# Patient Record
Sex: MALE | Race: WHITE | NOT HISPANIC OR LATINO | Employment: OTHER | URBAN - METROPOLITAN AREA
[De-identification: names, ages, dates, MRNs, and addresses within clinical notes are randomized per-mention and may not be internally consistent; named-entity substitution may affect disease eponyms.]

---

## 2018-05-05 ENCOUNTER — APPOINTMENT (OUTPATIENT)
Dept: GENERAL RADIOLOGY | Facility: CLINIC | Age: 68
End: 2018-05-05
Attending: FAMILY MEDICINE
Payer: MEDICARE

## 2018-05-05 ENCOUNTER — HOSPITAL ENCOUNTER (EMERGENCY)
Facility: CLINIC | Age: 68
Discharge: NURSING, REHAB OR LONG-TERM CARE | End: 2018-05-05
Attending: FAMILY MEDICINE | Admitting: FAMILY MEDICINE
Payer: MEDICARE

## 2018-05-05 VITALS
TEMPERATURE: 100.7 F | WEIGHT: 315 LBS | RESPIRATION RATE: 23 BRPM | BODY MASS INDEX: 56.58 KG/M2 | SYSTOLIC BLOOD PRESSURE: 110 MMHG | DIASTOLIC BLOOD PRESSURE: 82 MMHG | OXYGEN SATURATION: 93 %

## 2018-05-05 DIAGNOSIS — J18.9 PNEUMONIA OF RIGHT LOWER LOBE DUE TO INFECTIOUS ORGANISM: ICD-10-CM

## 2018-05-05 DIAGNOSIS — I50.9 CONGESTIVE HEART FAILURE, UNSPECIFIED CONGESTIVE HEART FAILURE CHRONICITY, UNSPECIFIED CONGESTIVE HEART FAILURE TYPE: ICD-10-CM

## 2018-05-05 LAB
ALBUMIN SERPL-MCNC: 3.4 G/DL (ref 3.4–5)
ALP SERPL-CCNC: 122 U/L (ref 40–150)
ALT SERPL W P-5'-P-CCNC: 25 U/L (ref 0–70)
ANION GAP SERPL CALCULATED.3IONS-SCNC: 4 MMOL/L (ref 3–14)
APTT PPP: 35 SEC (ref 22–37)
AST SERPL W P-5'-P-CCNC: 19 U/L (ref 0–45)
BASE DEFICIT BLDV-SCNC: 0.9 MMOL/L
BASE DEFICIT BLDV-SCNC: 1.6 MMOL/L
BASOPHILS # BLD AUTO: 0.1 10E9/L (ref 0–0.2)
BASOPHILS NFR BLD AUTO: 0.4 %
BILIRUB SERPL-MCNC: 1.1 MG/DL (ref 0.2–1.3)
BUN SERPL-MCNC: 27 MG/DL (ref 7–30)
CALCIUM SERPL-MCNC: 8.7 MG/DL (ref 8.5–10.1)
CHLORIDE SERPL-SCNC: 104 MMOL/L (ref 94–109)
CO2 SERPL-SCNC: 29 MMOL/L (ref 20–32)
CREAT SERPL-MCNC: 1.66 MG/DL (ref 0.66–1.25)
DIFFERENTIAL METHOD BLD: ABNORMAL
EOSINOPHIL # BLD AUTO: 0.2 10E9/L (ref 0–0.7)
EOSINOPHIL NFR BLD AUTO: 0.9 %
ERYTHROCYTE [DISTWIDTH] IN BLOOD BY AUTOMATED COUNT: 14.5 % (ref 10–15)
GFR SERPL CREATININE-BSD FRML MDRD: 42 ML/MIN/1.7M2
GLUCOSE SERPL-MCNC: 289 MG/DL (ref 70–99)
HCO3 BLDV-SCNC: 27 MMOL/L (ref 21–28)
HCO3 BLDV-SCNC: 31 MMOL/L (ref 21–28)
HCT VFR BLD AUTO: 48 % (ref 40–53)
HETEROPH AB SER QL: NEGATIVE
HGB BLD-MCNC: 15.4 G/DL (ref 13.3–17.7)
IMM GRANULOCYTES # BLD: 0.1 10E9/L (ref 0–0.4)
IMM GRANULOCYTES NFR BLD: 0.4 %
INR PPP: 2.52 (ref 0.86–1.14)
LACTATE BLD-SCNC: 1.5 MMOL/L (ref 0.7–2)
LIPASE SERPL-CCNC: 161 U/L (ref 73–393)
LYMPHOCYTES # BLD AUTO: 2.9 10E9/L (ref 0.8–5.3)
LYMPHOCYTES NFR BLD AUTO: 17.4 %
MAGNESIUM SERPL-MCNC: 1.8 MG/DL (ref 1.6–2.3)
MCH RBC QN AUTO: 31.2 PG (ref 26.5–33)
MCHC RBC AUTO-ENTMCNC: 32.1 G/DL (ref 31.5–36.5)
MCV RBC AUTO: 97 FL (ref 78–100)
MONOCYTES # BLD AUTO: 1.5 10E9/L (ref 0–1.3)
MONOCYTES NFR BLD AUTO: 9.1 %
NEUTROPHILS # BLD AUTO: 11.9 10E9/L (ref 1.6–8.3)
NEUTROPHILS NFR BLD AUTO: 71.8 %
NT-PROBNP SERPL-MCNC: 4075 PG/ML (ref 0–900)
PCO2 BLDV: 62 MM HG (ref 40–50)
PCO2 BLDV: 90 MM HG (ref 40–50)
PH BLDV: 7.15 PH (ref 7.32–7.43)
PH BLDV: 7.25 PH (ref 7.32–7.43)
PHOSPHATE SERPL-MCNC: 4.3 MG/DL (ref 2.5–4.5)
PLATELET # BLD AUTO: 185 10E9/L (ref 150–450)
PO2 BLDV: 31 MM HG (ref 25–47)
PO2 BLDV: 50 MM HG (ref 25–47)
POTASSIUM SERPL-SCNC: 5.3 MMOL/L (ref 3.4–5.3)
PROT SERPL-MCNC: 8.2 G/DL (ref 6.8–8.8)
RBC # BLD AUTO: 4.93 10E12/L (ref 4.4–5.9)
SODIUM SERPL-SCNC: 137 MMOL/L (ref 133–144)
TROPONIN I SERPL-MCNC: <0.015 UG/L (ref 0–0.04)
WBC # BLD AUTO: 16.6 10E9/L (ref 4–11)

## 2018-05-05 PROCEDURE — 85730 THROMBOPLASTIN TIME PARTIAL: CPT | Performed by: FAMILY MEDICINE

## 2018-05-05 PROCEDURE — 84100 ASSAY OF PHOSPHORUS: CPT | Performed by: FAMILY MEDICINE

## 2018-05-05 PROCEDURE — 40000275 ZZH STATISTIC RCP TIME EA 10 MIN

## 2018-05-05 PROCEDURE — 83605 ASSAY OF LACTIC ACID: CPT | Performed by: FAMILY MEDICINE

## 2018-05-05 PROCEDURE — 96367 TX/PROPH/DG ADDL SEQ IV INF: CPT | Performed by: FAMILY MEDICINE

## 2018-05-05 PROCEDURE — 25000125 ZZHC RX 250: Performed by: FAMILY MEDICINE

## 2018-05-05 PROCEDURE — 80053 COMPREHEN METABOLIC PANEL: CPT | Performed by: FAMILY MEDICINE

## 2018-05-05 PROCEDURE — 85610 PROTHROMBIN TIME: CPT | Performed by: FAMILY MEDICINE

## 2018-05-05 PROCEDURE — 94640 AIRWAY INHALATION TREATMENT: CPT

## 2018-05-05 PROCEDURE — 83735 ASSAY OF MAGNESIUM: CPT | Performed by: FAMILY MEDICINE

## 2018-05-05 PROCEDURE — 96365 THER/PROPH/DIAG IV INF INIT: CPT | Performed by: FAMILY MEDICINE

## 2018-05-05 PROCEDURE — 25000128 H RX IP 250 OP 636: Performed by: FAMILY MEDICINE

## 2018-05-05 PROCEDURE — 96375 TX/PRO/DX INJ NEW DRUG ADDON: CPT | Performed by: FAMILY MEDICINE

## 2018-05-05 PROCEDURE — 96376 TX/PRO/DX INJ SAME DRUG ADON: CPT | Performed by: FAMILY MEDICINE

## 2018-05-05 PROCEDURE — 86308 HETEROPHILE ANTIBODY SCREEN: CPT | Performed by: FAMILY MEDICINE

## 2018-05-05 PROCEDURE — 25000128 H RX IP 250 OP 636

## 2018-05-05 PROCEDURE — 93005 ELECTROCARDIOGRAM TRACING: CPT | Performed by: FAMILY MEDICINE

## 2018-05-05 PROCEDURE — 94660 CPAP INITIATION&MGMT: CPT

## 2018-05-05 PROCEDURE — 83690 ASSAY OF LIPASE: CPT | Performed by: FAMILY MEDICINE

## 2018-05-05 PROCEDURE — 99291 CRITICAL CARE FIRST HOUR: CPT | Mod: 25 | Performed by: FAMILY MEDICINE

## 2018-05-05 PROCEDURE — 82803 BLOOD GASES ANY COMBINATION: CPT | Mod: 91 | Performed by: FAMILY MEDICINE

## 2018-05-05 PROCEDURE — 99285 EMERGENCY DEPT VISIT HI MDM: CPT | Mod: 25 | Performed by: FAMILY MEDICINE

## 2018-05-05 PROCEDURE — 25000132 ZZH RX MED GY IP 250 OP 250 PS 637: Performed by: FAMILY MEDICINE

## 2018-05-05 PROCEDURE — 83880 ASSAY OF NATRIURETIC PEPTIDE: CPT | Performed by: FAMILY MEDICINE

## 2018-05-05 PROCEDURE — 96368 THER/DIAG CONCURRENT INF: CPT | Performed by: FAMILY MEDICINE

## 2018-05-05 PROCEDURE — 71045 X-RAY EXAM CHEST 1 VIEW: CPT

## 2018-05-05 PROCEDURE — 85025 COMPLETE CBC W/AUTO DIFF WBC: CPT | Performed by: FAMILY MEDICINE

## 2018-05-05 PROCEDURE — 93010 ELECTROCARDIOGRAM REPORT: CPT | Mod: Z6 | Performed by: FAMILY MEDICINE

## 2018-05-05 PROCEDURE — 84484 ASSAY OF TROPONIN QUANT: CPT | Performed by: FAMILY MEDICINE

## 2018-05-05 PROCEDURE — 99292 CRITICAL CARE ADDL 30 MIN: CPT | Mod: Z6 | Performed by: FAMILY MEDICINE

## 2018-05-05 RX ORDER — FUROSEMIDE 10 MG/ML
80 INJECTION INTRAMUSCULAR; INTRAVENOUS ONCE
Status: COMPLETED | OUTPATIENT
Start: 2018-05-05 | End: 2018-05-05

## 2018-05-05 RX ORDER — LORAZEPAM 2 MG/ML
INJECTION INTRAMUSCULAR
Status: COMPLETED
Start: 2018-05-05 | End: 2018-05-05

## 2018-05-05 RX ORDER — LORAZEPAM 2 MG/ML
2 INJECTION INTRAMUSCULAR ONCE
Status: COMPLETED | OUTPATIENT
Start: 2018-05-05 | End: 2018-05-05

## 2018-05-05 RX ORDER — LORAZEPAM 2 MG/ML
1 INJECTION INTRAMUSCULAR ONCE
Status: COMPLETED | OUTPATIENT
Start: 2018-05-05 | End: 2018-05-05

## 2018-05-05 RX ORDER — CEFTRIAXONE SODIUM 1 G/50ML
1 INJECTION, SOLUTION INTRAVENOUS ONCE
Status: COMPLETED | OUTPATIENT
Start: 2018-05-05 | End: 2018-05-05

## 2018-05-05 RX ORDER — DILTIAZEM HYDROCHLORIDE 5 MG/ML
20 INJECTION INTRAVENOUS ONCE
Status: COMPLETED | OUTPATIENT
Start: 2018-05-05 | End: 2018-05-05

## 2018-05-05 RX ORDER — IPRATROPIUM BROMIDE AND ALBUTEROL SULFATE 2.5; .5 MG/3ML; MG/3ML
3 SOLUTION RESPIRATORY (INHALATION) ONCE
Status: COMPLETED | OUTPATIENT
Start: 2018-05-05 | End: 2018-05-05

## 2018-05-05 RX ADMIN — LORAZEPAM 2 MG: 2 INJECTION INTRAMUSCULAR; INTRAVENOUS at 17:23

## 2018-05-05 RX ADMIN — LORAZEPAM 2 MG: 2 INJECTION INTRAMUSCULAR; INTRAVENOUS at 18:10

## 2018-05-05 RX ADMIN — HYDROCORTISONE SODIUM SUCCINATE 100 MG: 100 INJECTION, POWDER, FOR SOLUTION INTRAMUSCULAR; INTRAVENOUS at 17:30

## 2018-05-05 RX ADMIN — AZITHROMYCIN MONOHYDRATE 500 MG: 500 INJECTION, POWDER, LYOPHILIZED, FOR SOLUTION INTRAVENOUS at 18:12

## 2018-05-05 RX ADMIN — NITROGLYCERIN 15 MG: 20 OINTMENT TOPICAL at 18:00

## 2018-05-05 RX ADMIN — DILTIAZEM HYDROCHLORIDE 5 MG/HR: 5 INJECTION INTRAVENOUS at 18:37

## 2018-05-05 RX ADMIN — LORAZEPAM 2 MG: 2 INJECTION INTRAMUSCULAR at 17:23

## 2018-05-05 RX ADMIN — LORAZEPAM 1 MG: 2 INJECTION INTRAMUSCULAR; INTRAVENOUS at 17:30

## 2018-05-05 RX ADMIN — DILTIAZEM HYDROCHLORIDE 20 MG: 5 INJECTION INTRAVENOUS at 18:40

## 2018-05-05 RX ADMIN — IPRATROPIUM BROMIDE AND ALBUTEROL SULFATE 3 ML: .5; 3 SOLUTION RESPIRATORY (INHALATION) at 17:25

## 2018-05-05 RX ADMIN — CEFTRIAXONE SODIUM 1 G: 1 INJECTION, SOLUTION INTRAVENOUS at 18:42

## 2018-05-05 RX ADMIN — FUROSEMIDE 80 MG: 10 INJECTION, SOLUTION INTRAVENOUS at 17:26

## 2018-05-05 NOTE — ED NOTES
Pt given additional ativan. Attempted cardioversion x2 @ 200J. Unsuccessful. pt continues in afib with RVR

## 2018-05-05 NOTE — ED PROVIDER NOTES
History     Chief Complaint   Patient presents with     Respiratory Distress     HPI  Silvano Hobson is a 68 year old male, past medical history significant for anemia, malnutrition, Hanover's disease, sepsis, cardiomyopathy, atrial fibrillation, osteoarthritis, morbid obesity, pulmonary hypertension, pulmonary embolism, long-term anticoagulation for atrial fibrillation on Coumadin, presents via EMS with concerns of acute shortness of breath.  She is obtained largely from EMS as the patient is confused/obtunded at the time of presentation in respiratory distress.  Paramedics were called for increasing shortness of air with approximately half a day of shortness of breath by their history, no fever noted, no complaints of chest pain.  The patient seemed markedly short of breath and was hypoxic on arrival they placed supplemental oxygen and try to place BiPAP for the patient when he did not improve significantly with nonrebreather mask.  He was unable to tolerate the BiPAP and was fighting them.  EMS impression was of congestive heart failure and the patient was placed briefly on a nitroglycerin drip which was discontinued shortly prior to arrival.  Minimal history is obtained from the patient who is in extremis at the time of presentation.    Problem List:    Patient Active Problem List    Diagnosis Date Noted     Anemia 08/23/2013     Priority: Medium     Low serum cortisol level (H) 08/23/2013     Priority: Medium     Malnutrition (H) 08/23/2013     Priority: Medium     Sepsis (H) 08/23/2013     Priority: Medium     Problem list name updated by automated process. 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 Provider to review       Venous (peripheral) insufficiency 03/23/2006     Priority: Medium     Problem list name updated by automated process. Provider to review       Obesity 09/28/2005     Priority: Medium     weight 400  Problem list name updated by automated process. Provider to review       Essential hypertension 09/28/2005     Priority: Medium     Problem list name updated by automated process. Provider to review       Long term current use of anticoagulant therapy 09/28/2005     Priority: Medium     Pulmonary embolism   Problem list name updated by automated process. Provider to review       Primary pulmonary hypertension (H) 09/28/2005     Priority: Medium     Pulmonary embolism and infarction (H) 09/28/2005     Priority: Medium     Multiple PE by HX -Pulmonologist Dr. Rom Weinberg [facility]  Problem list name updated by automated process. Provider to review          Past Medical History:    Past Medical History:   Diagnosis Date     Long term (current) use of anticoagulants      Obesity, unspecified      Other pulmonary embolism and infarction      Primary pulmonary hypertension (H)      Unspecified essential hypertension        Past Surgical History:    Past Surgical History:   Procedure Laterality Date     COLONOSCOPY  4/5/05    Repeat 5 years     SURGICAL HISTORY OF -   1988    Gastric bypass     SURGICAL HISTORY OF -       Ventral hernia repair     SURGICAL HISTORY OF -   10/8/2003    post gastric bypass with gastrojejunostomy Billroth tube       Family History:    Family History   Problem Relation Age of Onset     Hypertension Mother      Hypertension Father      Cancer - colorectal Father        Social History:  Marital Status:    [2]  Social History   Substance Use Topics     Smoking status: Never Smoker     Smokeless tobacco: Not on file     Alcohol use Yes      Comment: occ        Medications:      acetaminophen (TYLENOL) 325 MG tablet   allopurinol (ZYLOPRIM) 300 MG tablet   amLODIPine (NORVASC) 5 MG tablet   COMPRESSION STOCKINGS   glipiZIDE (GLUCOTROL) 2.5 MG TABS   HEPARIN SODIUM, PORCINE, IV   hydrOXYzine (ATARAX) 25 MG tablet   metoprolol (LOPRESSOR) 50 MG tablet   ondansetron (ZOFRAN) 8 MG tablet   oxyCODONE (ROXICODONE) 5 MG immediate release tablet   senna-docusate (SENNA S) 8.6-50 MG per tablet   Warfarin Sodium (COUMADIN PO)         Review of Systems   Unable to perform ROS: Acuity of condition       Physical Exam   BP: (!) 121/99  Heart Rate: 186  Temp: 100.7  F (38.2  C)  Resp: 29  Weight: (!) 181.4 kg (400 lb)  SpO2: 93 %      Physical Exam  Confused if not frankly obtunded, does not answer questions.  Struggling with caregivers to remove IV tubing and facemask as well as attempt with BiPAP.  Clearly in extremis.  Markedly tachypneic, tachycardic, irregularly irregular rhythm.  Accessory muscle use.  Clinical respiratory failure.  HEENT oropharynx was clear tympanic members were normal PERRLA, PROM normal.  No cervical lymphadenopathy.  Chest inspiratory crackles bibasilar worse on the right side than the left.  Faint expiratory wheeze bilateral.  Heart sounds are irregularly irregular at approximately 180 bpm initially.  The abdomen is hugely obese.  Bowel sounds are absent.  Extremities obese and edematous bilateral lower extremities.  No evidence of trauma.      ED Course     ED Course     Procedures               EKG Interpretation:      Interpreted by Matt Carvajal  Time reviewed:     Symptoms at time of EKG: Diaphoresis, respiratory failure, obtundation  Rhythm: atrial fibrillation - rapid  Rate: 158  Axis: Left Axis Deviation  Ectopy: none  Conduction: normal  ST Segments/ T Waves: Non-specific ST-T wave  changes  Q Waves: none  Comparison to prior: No old EKG available    Clinical Impression: Atrial fibrillation with RVR, nonspecific ST-T wave changes.                Critical Care time:  was 90 minutes for this patient excluding procedures.  As we were notified prior to this patient's arrival preparations were made for intubation prior to the patient's arrival.  When he arrived given his level of confusion and combativeness I elected to sedate him initially and attempt BiPAP.  With Versed for sedation we were able to use BiPAP effectively and improve the patient's VBG significantly upon review of his VBGs in the emergency department.  He did not require intubation and improved on the BiPAP.   He remained markedly tachycardic in the 170-180 range A. fib RVR.  I attempted to correct this with synchronous cardioversion ×2 with very little if any effect upon the patient's overall rate and rhythm.  He was and subsequently cautiously given 20 mg of Cardizem and an infusion starting at 5 mg for rate control my goal being to bring him down into the 1 20 bpm range.  This ultimately was quite effective in reducing his right his rate, seemingly improving his comfort noting decreased requirement for sedation as well as improved blood pressure.  Lab diagnostics are reviewed noting a leukocytosis of 16.6 with left shift and bands.  INR is therapeutic at 2.5 to the patient on Coumadin.  His glucose is elevated at 289 BNP elevated at 4075.  His initial VBG showed a pH of 7.1.5 PCO2 of 90 venous bicarb of 31.  Subsequently approximately 40 minutes after BiPAP is pH improved to 7.25 PCO2 had decreased down to 62 SPO2 was higher at 50.  Further adjustments to the ventilator were made in consultation with the respiratory tech.  Chest x-ray is reviewed raising the suspicion of possible right lower lobe infiltrate.  Single view only.  I was able to review care everywhere for this patient and note a history of Lamoille's disease and for  that reason the patient received 100 mg of Solu-Cortef.  Given his presentation he received antibiotic therapy with Rocephin and Zithromax both intravenously.  The patient's presentation also raises the distinct possibility of a significant component of congestive heart failure with his elevated BNP as well as chest auscultatory findings and lower extremity changes.  He received 80 mg of IV Lasix initially as well as cutaneous nitrates.  Multiple attempts were made to catheterize him however this was technically difficult due to the small size of his penis we were unable to get him catheterized to accurately quantify urine output.  As per my conversation with EMS the patient had requested to be taken to [facility] prior to becoming unstable in transport and diverting here for stabilization.  The patient certainly requires ICU admission, I spoke with the intensivist at [facility] who agreed to accept his care there as noted below.        Labs Ordered and Resulted from Time of ED Arrival Up to the Time of Departure from the ED   CBC WITH PLATELETS DIFFERENTIAL - Abnormal; Notable for the following:        Result Value    WBC 16.6 (*)     Absolute Neutrophil 11.9 (*)     Absolute Monocytes 1.5 (*)     All other components within normal limits   INR - Abnormal; Notable for the following:     INR 2.52 (*)     All other components within normal limits   COMPREHENSIVE METABOLIC PANEL - Abnormal; Notable for the following:     Glucose 289 (*)     Creatinine 1.66 (*)     GFR Estimate 42 (*)     GFR Estimate If Black 50 (*)     All other components within normal limits   NT PROBNP INPATIENT - Abnormal; Notable for the following:     N-Terminal Pro BNP Inpatient 4075 (*)     All other components within normal limits   BLOOD GAS VENOUS - Abnormal; Notable for the following:     Ph Venous 7.15 (*)     PCO2 Venous 90 (*)     Bicarbonate Venous 31 (*)     All other components within normal limits   BLOOD GAS VENOUS -  Abnormal; Notable for the following:     Ph Venous 7.25 (*)     PCO2 Venous 62 (*)     PO2 Venous 50 (*)     All other components within normal limits   MONONUCLEOSIS SCREEN   PARTIAL THROMBOPLASTIN TIME   MAGNESIUM   PHOSPHORUS   LIPASE   LACTIC ACID WHOLE BLOOD   TROPONIN I   '         Results for orders placed or performed during the hospital encounter of 05/05/18 (from the past 24 hour(s))   XR Chest Port 1 View    Narrative    XR CHEST PORT 1 VW  5/5/2018 5:49 PM     HISTORY:  soa;     COMPARISON: None.    FINDINGS:  The patient is rotated towards the left. There is increased  opacity in the right lung base. This may be due to the area of  pneumonia. There is slight blunting of the right costophrenic angle  would suggest an associated pleural effusion.      Impression    IMPRESSION: Right basilar opacity, suspect pneumonia.    KALEIGH EDGE MD   Blood gas venous   Result Value Ref Range    Ph Venous 7.25 (L) 7.32 - 7.43 pH    PCO2 Venous 62 (H) 40 - 50 mm Hg    PO2 Venous 50 (H) 25 - 47 mm Hg    Bicarbonate Venous 27 21 - 28 mmol/L    Base Deficit Venous 1.6 mmol/L       Medications   LORazepam (ATIVAN) injection 2 mg (2 mg Intravenous Given 5/5/18 1723)   ipratropium - albuterol 0.5 mg/2.5 mg/3 mL (DUONEB) neb solution 3 mL (3 mLs Nebulization Given 5/5/18 1725)   furosemide (LASIX) injection 80 mg (80 mg Intravenous Given 5/5/18 1726)   nitroGLYcerin (NITRO-BID) 2 % ointment 15 mg (15 mg Transdermal Given 5/5/18 1800)   LORazepam (ATIVAN) injection 1 mg (1 mg Intravenous Given 5/5/18 1730)   hydrocortisone sodium succinate PF (solu-CORTEF) injection 100 mg (0 mg Intravenous Stopped 5/5/18 1849)   cefTRIAXone in d5w (ROCEPHIN) intermittent infusion 1 g (0 g Intravenous Stopped 5/5/18 1913)   azithromycin (ZITHROMAX) 500 mg in sodium chloride 0.9 % 250 mL intermittent infusion (0 mg Intravenous Stopped 5/5/18 1913)   LORazepam (ATIVAN) injection 2 mg (2 mg Intravenous Given 5/5/18 1810)   diltiazem (CARDIZEM)  injection 20 mg (20 mg Intravenous Given 5/5/18 1840)     6:29 PM  Patient was discussed with Dr. Marquez in the ICU at [facility] (the patient had requested to be transferred to [facility] before he became unstable and was brought here) I reviewed the patient presentation in its entirety and Dr. Marquez was comfortable taking the patient in transport to the care of the ICU by ambulance    Assessments & Plan (with Medical Decision Making)   68-year-old male past medical history reviewed as above who presents in acute respiratory distress as discussed in the HPI.  Little history is available from the patient.  His wife was not available during his time in the emergency department.  Attempts by nursing staff will need to contact her.  Patient was stabilized by dressing was felt to be his primary problems at presentation including an infectious process in his lung in the form of pneumonia as well as congestive heart failure.  This is difficult in the context of his morbid obesity to manage these problems.  As the patient had originally requested be taken to [facility] they were contacted and were actually able to accommodate the patient in transfer for admission there.  All questions were answered and the patient was transferred by ambulance to [facility] ICU.      Disclaimer: This note consists of symbols derived from keyboarding, dictation and/or voice recognition software. As a result, there may be errors in the script that have gone undetected. Please consider this when interpreting information found in this chart.      I have reviewed the nursing notes.    I have reviewed the findings, diagnosis, plan and need for follow up with the patient.       Discharge Medication List as of 5/5/2018  8:25 PM          Final diagnoses:   Congestive heart failure, unspecified congestive heart failure chronicity, unspecified congestive heart failure type (H)   Pneumonia of right lower lobe due to infectious organism (H)        5/5/2018   [facility] EMERGENCY DEPARTMENT     Matt Carvajal MD  05/06/18 [id number]

## 2018-05-05 NOTE — ED NOTES
Pt arrived vie ems in acute resp distress, became acutely SOB after using the bathroom at home and called 911. Pt restless, did not tolerated bipap enroute. RT called pt setup for intubation. Continued high flow o2. Pt more somulent and bipap restarted. afib with RVR in monitor.

## 2018-10-26 ENCOUNTER — RECORDS - HEALTHEAST (OUTPATIENT)
Dept: ADMINISTRATIVE | Facility: OTHER | Age: 68
End: 2018-10-26

## 2018-10-26 ENCOUNTER — OFFICE VISIT - HEALTHEAST (OUTPATIENT)
Dept: VASCULAR SURGERY | Facility: CLINIC | Age: 68
End: 2018-10-26

## 2018-10-26 DIAGNOSIS — E66.01 MORBID OBESITY WITH BMI OF 50.0-59.9, ADULT (H): ICD-10-CM

## 2018-10-26 DIAGNOSIS — I50.9 CHF (CONGESTIVE HEART FAILURE) (H): ICD-10-CM

## 2018-10-26 DIAGNOSIS — I87.2 VENOUS INSUFFICIENCY OF BOTH LOWER EXTREMITIES: ICD-10-CM

## 2018-10-26 DIAGNOSIS — L97.212 VENOUS STASIS ULCER OF RIGHT CALF WITH FAT LAYER EXPOSED WITHOUT VARICOSE VEINS (H): ICD-10-CM

## 2018-10-26 DIAGNOSIS — I87.2 VENOUS STASIS ULCER OF RIGHT CALF WITH FAT LAYER EXPOSED WITHOUT VARICOSE VEINS (H): ICD-10-CM

## 2018-10-26 DIAGNOSIS — I87.303 VENOUS HYPERTENSION OF BOTH LOWER EXTREMITIES: ICD-10-CM

## 2018-10-26 DIAGNOSIS — N18.4 CKD (CHRONIC KIDNEY DISEASE), SYMPTOM MANAGEMENT ONLY, STAGE 4 (SEVERE) (H): ICD-10-CM

## 2018-10-26 ASSESSMENT — MIFFLIN-ST. JEOR: SCORE: 2545.27

## 2018-11-02 ENCOUNTER — OFFICE VISIT - HEALTHEAST (OUTPATIENT)
Dept: VASCULAR SURGERY | Facility: CLINIC | Age: 68
End: 2018-11-02

## 2018-11-02 ENCOUNTER — COMMUNICATION - HEALTHEAST (OUTPATIENT)
Dept: VASCULAR SURGERY | Facility: CLINIC | Age: 68
End: 2018-11-02

## 2018-11-02 ENCOUNTER — RECORDS - HEALTHEAST (OUTPATIENT)
Dept: ADMINISTRATIVE | Facility: OTHER | Age: 68
End: 2018-11-02

## 2018-11-02 DIAGNOSIS — N18.4 CKD (CHRONIC KIDNEY DISEASE), SYMPTOM MANAGEMENT ONLY, STAGE 4 (SEVERE) (H): ICD-10-CM

## 2018-11-02 DIAGNOSIS — I87.303 VENOUS HYPERTENSION OF BOTH LOWER EXTREMITIES: ICD-10-CM

## 2018-11-02 DIAGNOSIS — L97.212 VENOUS STASIS ULCER OF RIGHT CALF WITH FAT LAYER EXPOSED WITHOUT VARICOSE VEINS (H): ICD-10-CM

## 2018-11-02 DIAGNOSIS — E66.01 MORBID OBESITY WITH BMI OF 50.0-59.9, ADULT (H): ICD-10-CM

## 2018-11-02 DIAGNOSIS — I87.2 VENOUS INSUFFICIENCY OF BOTH LOWER EXTREMITIES: ICD-10-CM

## 2018-11-02 DIAGNOSIS — I50.9 CHF (CONGESTIVE HEART FAILURE) (H): ICD-10-CM

## 2018-11-02 DIAGNOSIS — I87.2 VENOUS STASIS ULCER OF RIGHT CALF WITH FAT LAYER EXPOSED WITHOUT VARICOSE VEINS (H): ICD-10-CM

## 2018-11-07 ENCOUNTER — COMMUNICATION - HEALTHEAST (OUTPATIENT)
Dept: VASCULAR SURGERY | Facility: CLINIC | Age: 68
End: 2018-11-07

## 2018-11-13 ENCOUNTER — COMMUNICATION - HEALTHEAST (OUTPATIENT)
Dept: VASCULAR SURGERY | Facility: CLINIC | Age: 68
End: 2018-11-13

## 2018-11-16 ENCOUNTER — AMBULATORY - HEALTHEAST (OUTPATIENT)
Dept: VASCULAR SURGERY | Facility: CLINIC | Age: 68
End: 2018-11-16

## 2018-11-16 ENCOUNTER — COMMUNICATION - HEALTHEAST (OUTPATIENT)
Dept: VASCULAR SURGERY | Facility: CLINIC | Age: 68
End: 2018-11-16

## 2018-11-16 DIAGNOSIS — I87.2 VENOUS STASIS DERMATITIS OF BOTH LOWER EXTREMITIES: ICD-10-CM

## 2018-11-16 DIAGNOSIS — I87.2 VENOUS INSUFFICIENCY OF BOTH LOWER EXTREMITIES: ICD-10-CM

## 2018-11-16 DIAGNOSIS — I87.303 VENOUS HYPERTENSION OF LOWER EXTREMITY, BILATERAL: ICD-10-CM

## 2018-11-16 DIAGNOSIS — I87.2 VENOUS STASIS ULCER OF RIGHT CALF WITH FAT LAYER EXPOSED WITHOUT VARICOSE VEINS (H): ICD-10-CM

## 2018-11-16 DIAGNOSIS — L97.212 VENOUS STASIS ULCER OF RIGHT CALF WITH FAT LAYER EXPOSED WITHOUT VARICOSE VEINS (H): ICD-10-CM

## 2018-11-23 ENCOUNTER — OFFICE VISIT - HEALTHEAST (OUTPATIENT)
Dept: VASCULAR SURGERY | Facility: CLINIC | Age: 68
End: 2018-11-23

## 2018-11-23 DIAGNOSIS — I87.303 VENOUS HYPERTENSION OF LOWER EXTREMITY, BILATERAL: ICD-10-CM

## 2018-11-23 DIAGNOSIS — L97.212 VENOUS STASIS ULCER OF RIGHT CALF WITH FAT LAYER EXPOSED WITHOUT VARICOSE VEINS (H): ICD-10-CM

## 2018-11-23 DIAGNOSIS — L89.893 PRESSURE INJURY OF OTHER SITE, STAGE 3 (H): ICD-10-CM

## 2018-11-23 DIAGNOSIS — I87.303 VENOUS HYPERTENSION OF BOTH LOWER EXTREMITIES: ICD-10-CM

## 2018-11-23 DIAGNOSIS — I87.2 VENOUS INSUFFICIENCY OF BOTH LOWER EXTREMITIES: ICD-10-CM

## 2018-11-23 DIAGNOSIS — I50.9 CONGESTIVE HEART FAILURE, UNSPECIFIED HF CHRONICITY, UNSPECIFIED HEART FAILURE TYPE (H): ICD-10-CM

## 2018-11-23 DIAGNOSIS — I87.2 VENOUS STASIS DERMATITIS OF BOTH LOWER EXTREMITIES: ICD-10-CM

## 2018-11-23 DIAGNOSIS — E66.01 MORBID OBESITY WITH BMI OF 50.0-59.9, ADULT (H): ICD-10-CM

## 2018-11-23 DIAGNOSIS — N18.4 CKD (CHRONIC KIDNEY DISEASE), SYMPTOM MANAGEMENT ONLY, STAGE 4 (SEVERE) (H): ICD-10-CM

## 2018-11-23 DIAGNOSIS — I87.2 VENOUS STASIS ULCER OF RIGHT CALF WITH FAT LAYER EXPOSED WITHOUT VARICOSE VEINS (H): ICD-10-CM

## 2018-11-23 ASSESSMENT — MIFFLIN-ST. JEOR: SCORE: 2587.91

## 2018-12-05 ENCOUNTER — COMMUNICATION - HEALTHEAST (OUTPATIENT)
Dept: VASCULAR SURGERY | Facility: CLINIC | Age: 68
End: 2018-12-05

## 2018-12-10 ENCOUNTER — COMMUNICATION - HEALTHEAST (OUTPATIENT)
Dept: VASCULAR SURGERY | Facility: CLINIC | Age: 68
End: 2018-12-10

## 2018-12-10 DIAGNOSIS — I87.2 VENOUS INSUFFICIENCY OF BOTH LOWER EXTREMITIES: ICD-10-CM

## 2018-12-10 DIAGNOSIS — I87.303 VENOUS HYPERTENSION OF LOWER EXTREMITY, BILATERAL: ICD-10-CM

## 2018-12-10 DIAGNOSIS — I87.2 VENOUS STASIS DERMATITIS OF BOTH LOWER EXTREMITIES: ICD-10-CM

## 2018-12-14 ENCOUNTER — OFFICE VISIT - HEALTHEAST (OUTPATIENT)
Dept: VASCULAR SURGERY | Facility: CLINIC | Age: 68
End: 2018-12-14

## 2018-12-14 DIAGNOSIS — I87.303 VENOUS HYPERTENSION OF BOTH LOWER EXTREMITIES: ICD-10-CM

## 2018-12-14 DIAGNOSIS — I50.9 CONGESTIVE HEART FAILURE, UNSPECIFIED HF CHRONICITY, UNSPECIFIED HEART FAILURE TYPE (H): ICD-10-CM

## 2018-12-14 DIAGNOSIS — I87.2 VENOUS INSUFFICIENCY OF BOTH LOWER EXTREMITIES: ICD-10-CM

## 2018-12-14 DIAGNOSIS — I87.2 VENOUS STASIS DERMATITIS OF BOTH LOWER EXTREMITIES: ICD-10-CM

## 2018-12-14 DIAGNOSIS — E66.01 MORBID OBESITY WITH BMI OF 50.0-59.9, ADULT (H): ICD-10-CM

## 2018-12-14 DIAGNOSIS — I87.303 VENOUS HYPERTENSION OF LOWER EXTREMITY, BILATERAL: ICD-10-CM

## 2018-12-14 DIAGNOSIS — I87.2 VENOUS STASIS ULCER OF RIGHT CALF WITH FAT LAYER EXPOSED WITHOUT VARICOSE VEINS (H): ICD-10-CM

## 2018-12-14 DIAGNOSIS — L97.212 VENOUS STASIS ULCER OF RIGHT CALF WITH FAT LAYER EXPOSED WITHOUT VARICOSE VEINS (H): ICD-10-CM

## 2018-12-14 DIAGNOSIS — N18.4 CKD (CHRONIC KIDNEY DISEASE), SYMPTOM MANAGEMENT ONLY, STAGE 4 (SEVERE) (H): ICD-10-CM

## 2019-01-08 ENCOUNTER — OFFICE VISIT - HEALTHEAST (OUTPATIENT)
Dept: VASCULAR SURGERY | Facility: CLINIC | Age: 69
End: 2019-01-08

## 2019-01-08 ENCOUNTER — RECORDS - HEALTHEAST (OUTPATIENT)
Dept: ADMINISTRATIVE | Facility: OTHER | Age: 69
End: 2019-01-08

## 2019-01-08 DIAGNOSIS — N18.4 CKD (CHRONIC KIDNEY DISEASE), SYMPTOM MANAGEMENT ONLY, STAGE 4 (SEVERE) (H): ICD-10-CM

## 2019-01-08 DIAGNOSIS — I87.303 VENOUS HYPERTENSION OF BOTH LOWER EXTREMITIES: ICD-10-CM

## 2019-01-08 DIAGNOSIS — L97.212 VENOUS STASIS ULCER OF RIGHT CALF WITH FAT LAYER EXPOSED WITHOUT VARICOSE VEINS (H): ICD-10-CM

## 2019-01-08 DIAGNOSIS — I50.9 CONGESTIVE HEART FAILURE, UNSPECIFIED HF CHRONICITY, UNSPECIFIED HEART FAILURE TYPE (H): ICD-10-CM

## 2019-01-08 DIAGNOSIS — I87.303 VENOUS HYPERTENSION OF LOWER EXTREMITY, BILATERAL: ICD-10-CM

## 2019-01-08 DIAGNOSIS — I87.2 VENOUS STASIS DERMATITIS OF BOTH LOWER EXTREMITIES: ICD-10-CM

## 2019-01-08 DIAGNOSIS — I87.2 VENOUS INSUFFICIENCY OF BOTH LOWER EXTREMITIES: ICD-10-CM

## 2019-01-08 DIAGNOSIS — I87.2 VENOUS STASIS ULCER OF RIGHT CALF WITH FAT LAYER EXPOSED WITHOUT VARICOSE VEINS (H): ICD-10-CM

## 2019-01-08 DIAGNOSIS — E66.01 MORBID OBESITY WITH BMI OF 50.0-59.9, ADULT (H): ICD-10-CM

## 2019-01-08 ASSESSMENT — MIFFLIN-ST. JEOR: SCORE: 2532.11

## 2019-01-29 ENCOUNTER — COMMUNICATION - HEALTHEAST (OUTPATIENT)
Dept: VASCULAR SURGERY | Facility: CLINIC | Age: 69
End: 2019-01-29

## 2019-02-07 ENCOUNTER — AMBULATORY - HEALTHEAST (OUTPATIENT)
Dept: VASCULAR SURGERY | Facility: CLINIC | Age: 69
End: 2019-02-07

## 2019-02-07 ENCOUNTER — COMMUNICATION - HEALTHEAST (OUTPATIENT)
Dept: VASCULAR SURGERY | Facility: CLINIC | Age: 69
End: 2019-02-07

## 2019-02-28 ENCOUNTER — COMMUNICATION - HEALTHEAST (OUTPATIENT)
Dept: VASCULAR SURGERY | Facility: CLINIC | Age: 69
End: 2019-02-28

## 2021-06-02 VITALS — WEIGHT: 315 LBS | HEIGHT: 70 IN | BODY MASS INDEX: 45.1 KG/M2

## 2021-06-17 NOTE — PATIENT INSTRUCTIONS - HE
Patient Instructions by Davida Brooks NP at 1/8/2019  9:20 AM     Author: Davida Brooks NP Service: -- Author Type: Nurse Practitioner    Filed: 1/8/2019  9:46 AM Encounter Date: 1/8/2019 Status: Signed    : Davida Brooks NP (Nurse Practitioner)       Right leg, lateral aspect  Every 3-4 days  Cleanse with saline  Apply lotion to intact skin  Apply endoform collagen  Oil emulsion  ABD; rolled gauze  2 layer to the right    Collagen Dressings    These dressings are typically created from bovine (cow), laisha (bird), or porcine (pig) products. Collagens can come in a variety of forms including: powder; gel; pads, or freeze-dried sheets. They can be cut, formed and layered to fit the size of your wound.  Collagen dressings are used to stimulate wound healing. Some collagens contain silver which help treat infected or wounds which have been over-colonized with bacteria. Your provider will determine if silver is appropriate for your wound.   Some examples of collagen dressings include: Endoform, Promogran, Destiny    ,

## 2021-06-18 NOTE — LETTER
Letter by Davida Brooks NP at      Author: Davida Brooks NP Service: -- Author Type: --    Filed:  Encounter Date: 2019 Status: (Other)       2019    [facility]  Fax: [phone] Wound Dressing Rx and Order Form  Customer Service: [phone] Referral #:  [id number] ([place])   Order Status: New Order   Verbal: Deann          Patient Info:  Name: Silvano Hobson  : 1950  Address:   [address] [address] [facility] [address]  Phone: [phone]      Insurance Info:  Primary: Payor: MEDICARE / Plan: MEDICARE A AND B / Product Type: Medicare /    Secondary: BLUE CROSS BLUE CROSS PLATINUM  [id number] - (Blue Cross Blue Shield)    Physician Info:   Name:  Davida Brooks NP   Dept Address/Phones:   [address] [place] [address], [address]  [facility] [address] [phone]  Fax: [phone]    Lymphedema circumferential measurements (in cm):  Right just above MTP: 26.3    Right Ankle: 26.2    Right Widest Calf: 46.8    Right Thigh Up 10cm: 64.4    Left - just above MTP: 26.4    Left Ankle: 24.4    Left Widest Calf: 48.8    Left Thigh Up 10cm: 62.7        Wound info:  Encounter Diagnoses   Name Primary?   ? Venous hypertension of lower extremity, bilateral Yes   ? Venous insufficiency of both lower extremities    ? Venous stasis dermatitis of both lower extremities    ? Venous stasis ulcer of right calf with fat layer exposed without varicose veins (H)    ? Venous hypertension of both lower extremities    ? Congestive heart failure, unspecified HF chronicity, unspecified heart failure type (H)    ? CKD (chronic kidney disease), symptom management only, stage 4 (severe) (H)    ? Morbid obesity with BMI of 50.0-59.9, adult (H)      Wound 16 Leg Left (Active)       Wound 16 Leg Left;Proximal (near to center) (Active)       VASC Wound 10/26/18 Rt lateral calf (Active)   Pre Size Length 4.2 2019  9:00 AM   Pre Size Width 0.8 2019  9:00 AM   Pre  Size Depth 0.1 1/8/2019  9:00 AM   Pre Total Sq cm 3.36 1/8/2019  9:00 AM       VASC Wound 11/23/18 ABD (Active)   Pre Size Length 0.5 11/23/2018 10:00 AM   Pre Size Width 0.5 11/23/2018 10:00 AM   Pre Size Depth 0.1 11/23/2018 10:00 AM   Pre Total Sq cm 0.25 11/23/2018 10:00 AM       VASC Wound 01/08/19 Rt lateral (Posterior) (Active)   Pre Size Length 3.8 1/8/2019  9:00 AM   Pre Size Width 1.2 1/8/2019  9:00 AM   Pre Size Depth 0.1 1/8/2019  9:00 AM   Pre Total Sq cm 4.56 1/8/2019  9:00 AM     Drainage: Moderate  Thickness:  Full  Duration of Need: 30  Days Supply: 30  Start Date: 1/8/19  Starter Kit: Ancillary Kit (saline, gloves, gauze)  Qualifying wound/Debridement Yes        Dressing Type Brand Size Number of pieces Frequency of change    Primary Endoform fenestrated   2''x2'' 1 box  every four days                   Secondary ABD Pads  5''x9'' Max  every four days           Tape            Note: If total out of pocket is more than $50.00 please contact the patient before processing order.     OK to forward to covered supplier.    Electronically Signed Physician: Davida Brooks NP Date: 1/8/2019

## 2021-06-23 NOTE — PROGRESS NOTES
Patient greater than 15 min late for appointment and provider gone for day. Trying to contact patient to come in early due to weather and got no answer but did leave a message. Patient demanding to be seen as in pain. Suggested by RN(Umer) to go emergency room or urgency room if patient in that severe of pain. Wife continues to do wound cares and 2 layer wrapping.

## 2021-06-24 NOTE — TELEPHONE ENCOUNTER
Patients spouse called to update us that patient will be following care else where and will no longer need to see LK. No reschedule needed.

## 2021-06-26 NOTE — PROGRESS NOTES
Progress Notes by Davida Brooks NP at 11/2/2018  9:40 AM     Author: Davida Brooks NP Service: -- Author Type: Nurse Practitioner    Filed: 11/2/2018 12:44 PM Encounter Date: 11/2/2018 Status: Signed    : Davida Brooks NP (Nurse Practitioner)       Follow up Vascular Visit       Date of Service:11/2/2018    Date Last Seen: Visit date not found; Visit date not found    Chief Complaint: new right calf wound    History:   Past Medical History:   Diagnosis Date   ? Miguel's disease (H)    ? Adrenal insufficiency (H)    ? Atrial fibrillation (H)    ? Cancer (H)     basal cell carcinoma of the nose   ? Chronic kidney disease    ? Deep vein thrombosis (H)    ? Degenerative joint disease of knee    ? Diabetes mellitus (H)    ? Gout    ? Hepatic steatosis    ? Hypertension    ? Lymphedema    ? Morbid obesity (H)    ? Obesity    ? Pityrosporum folliculitis    ? Pulmonary embolism (H)    ? Systolic heart failure (H)    ? Venous insufficiency        Pt returns to the [facility] with regards to their new right calf wound. Arrives with wife today. Has previously not been seen for 2 years; They report that he was hospitalized this past May for CHF, stage 4 CKD; he is taking bumex prn. He is no longer weighing himself daily. They report that the wound on the right leg started as thickened skin and then he bumped the area and it ulcerated 2-3 weeks ago. We started him on endoform last week and ran him for epifix this was declined. We wrapped his right leg in a 2 layer; tolerated well and stayed in place. He had a lapse in wearing his compression stockings and has not been replacing these in a timely fashion. He denies fevers, chills. Is having pain in the wound.    Allergies: Morphine; Daptomycin; Vancomycin; and Zyvox [linezolid]    Physical Exam:    There were no vitals taken for this visit.    General:  Patient presents to clinic in no apparent distress.  Head:  normocephalic atraumatic  Psychiatric:  Alert and oriented x3.   Respiratory: unlabored breathing; no cough  Integumentary:  Skin is uniformly warm, dry and pink.    Wound #1 Location: right calf  Size: 3L x 1.6W x 0.1depth.  nosinus tract present, Wound base: initially covered with fibrinous material this was debrided reveal 100% viable wound bed;  No undermining present. Periwound: no denudement, erythema, induration, maceration or warmth.  Swelling improved see circumferential measures below.    Circumferential volume measures:    Vasc Edema 5/4/2016 6/8/2016 10/26/2018 11/2/2018   Right just above MTP 25 25.5 26.6 25.6   Right Ankle 24.5 24.4 25.4 25   Right Widest Calf 48 53 49 48.4   Right Thigh Up 10cm - - 74.4 70   Left - just above MTP 26 25.5 26 26.2   Left Ankle 25 24.3 25 24.6   Left Widest Calf 43.5 48 49.8 47.5   Left Thigh Up 10cm - - 66.4 65.5       Ulceration(s)/Wound(s):     Wound 03/07/16 Leg Left (Active)       Wound 05/04/16 Leg Left;Proximal (near to center) (Active)       VASC Wound 10/26/18 Rt lateral calf (Active)   Pre Size Length 3 11/2/2018  9:00 AM   Pre Size Width 1.6 11/2/2018  9:00 AM   Pre Size Depth 0.1 11/2/2018  9:00 AM   Pre Total Sq cm 4.8 11/2/2018  9:00 AM        Lab Values    No results found for: SEDRATE  No results found for: CREATININE  No results found for: HGBA1C  No results found for: BUN  Lab Results   Component Value Date    ALBUMIN 3.3 (L) 03/21/2016     No results found for: [id number]          Impression:  1. Venous stasis ulcer of right calf with fat layer exposed without varicose veins (H)  mupirocin (BACTROBAN) 2 % ointment    gentamicin (GARAMYCIN) 0.1 % ointment   2. Venous insufficiency of both lower extremities  mupirocin (BACTROBAN) 2 % ointment    gentamicin (GARAMYCIN) 0.1 % ointment   3. Venous hypertension of both lower extremities  mupirocin (BACTROBAN) 2 % ointment    gentamicin (GARAMYCIN) 0.1 % ointment   4. CHF (congestive heart failure) (H)   mupirocin (BACTROBAN) 2 % ointment    gentamicin (GARAMYCIN) 0.1 % ointment   5. CKD (chronic kidney disease), symptom management only, stage 4 (severe) (H)  mupirocin (BACTROBAN) 2 % ointment    gentamicin (GARAMYCIN) 0.1 % ointment   6. Morbid obesity with BMI of 50.0-59.9, adult (H)  mupirocin (BACTROBAN) 2 % ointment    gentamicin (GARAMYCIN) 0.1 % ointment     11/2/18 right calf          10/26/18 right calf             Are any of these wounds new today: no    Assessment/Plan:          1. Debridement: Excisional debridement of the ulcer(s) was recommended today, after consent was obtained and 2% Xylocaine was applied using a sterile curet the epidermal, dermal and down into the subcutaneous tissue was sharply debrided for a total square cm of . The non-viable and necrotic tissue was removed and the wounds appeared much  afterwards.             2. Edema: need for daily wound care; will go to tubigrips and short stretch; wife will do this           3.  Wound treatment: wound treatment will include irrigation and dressings to promote autolytic debridement which will include:wound is worse today; I suspect critical colonization; pt declines oral antibiotics; so I will not obtain a culture; will treat with Bactroban and gentamycin for gram - and + coverage; cover with oil emulsion; gauze; rolled gauze; change daily           4. Nutrition: we spoke about his weight and how this contributes to swelling; reminded to weight self daily for monitoring of chf           5. Offloading: na     Patient will follow up with me in 3 weeks for reevaluation. They were instructed to call the clinic sooner with any signs or symptoms of infection or any further questions/concerns. Answered all questions.    Davida Brooks DNP, RN, CNP, [facility]  [phone]        This note was electronically signed by Davida Brooks

## 2021-06-26 NOTE — PROGRESS NOTES
Progress Notes by Davida Brooks NP at 10/26/2018 10:20 AM     Author: Davida Brooks NP Service: -- Author Type: Nurse Practitioner    Filed: 10/26/2018  1:15 PM Encounter Date: 10/26/2018 Status: Signed    : Davida Brooks NP (Nurse Practitioner)       Follow up Vascular Visit       Date of Service:10/26/2018    Date Last Seen: Visit date not found; Visit date not found    Chief Complaint: new right calf wound    History:   Past Medical History:   Diagnosis Date   ? [place]'s disease (H)    ? Adrenal insufficiency (H)    ? Atrial fibrillation (H)    ? Cancer (H)     basal cell carcinoma of the nose   ? Chronic kidney disease    ? Deep vein thrombosis (H)    ? Degenerative joint disease of knee    ? Diabetes mellitus (H)    ? Gout    ? Hepatic steatosis    ? Hypertension    ? Lymphedema    ? Morbid obesity (H)    ? Obesity    ? Pityrosporum folliculitis    ? Pulmonary embolism (H)    ? Systolic heart failure (H)    ? Venous insufficiency        Pt returns to the [facility] with regards to their new right calf wound. Arrives with wife today. Has not been seen for 2 years; previously was seen and treated for leg wounds and swelling; had RFA completed with Dr. Alanis. They report that he was hospitalized this past May for CHF, stage 4 CKD; he is taking bumex prn. He is no longer weighing himself daily. They report that the wound on the right leg started as thickened skin and then he bumped the area and it ulcerated 1-2 weeks ago. They had leftover mepilex ag foam and began applying this along with coban; this was wrapped from the ankle to the calf today. He had a lapse in wearing his compression stockings and has not been replacing these in a timely fashion. He denies fevers, chills. Is having pain in the wound. They are asking to have epifix applied to the wound due to the pain.     Allergies: Morphine; Daptomycin; Vancomycin; and Zyvox  "[linezolid]    Physical Exam:    /74 (Patient Position: Sitting)  Pulse 82  Temp 99.3  F (37.4  C) (Oral)   Resp 22  Ht 5' 10\" (1.778 m)  Wt (!) 391 lb 1.6 oz (177.4 kg)  BMI 56.12 kg/m2    General:  Patient presents to clinic in no apparent distress.  Head: normocephalic atraumatic  Psychiatric:  Alert and oriented x3.   Respiratory: unlabored breathing; no cough  Integumentary:  Skin is uniformly warm, dry and pink.    Wound #1 Location: right calf  Size: 3L x 2W x 0.1depth.  nosinus tract present, Wound base: initially covered with fibrinous material this was debrided reveal 100% viable wound bed;  No undermining present. Periwound: no denudement, erythema, induration, maceration or warmth.  Swelling stable see circumferential measures below.    Circumferential volume measures:    Vasc Edema 5/4/2016 6/8/2016 10/26/2018   Right just above MTP 25 25.5 26.6   Right Ankle 24.5 24.4 25.4   Right Widest Calf 48 53 49   Right Thigh Up 10cm - - 74.4   Left - just above MTP 26 25.5 26   Left Ankle 25 24.3 25   Left Widest Calf 43.5 48 49.8   Left Thigh Up 10cm - - 66.4       Ulceration(s)/Wound(s):     Wound 03/07/16 Leg Left (Active)       Wound 05/04/16 Leg Left;Proximal (near to center) (Active)       VASC Wound 10/26/18 Rt lateral calf (Active)   Pre Size Length 3 10/26/2018  9:00 AM   Pre Size Width 2 10/26/2018  9:00 AM   Pre Size Depth 0.1 10/26/2018  9:00 AM   Pre Total Sq cm 6 10/26/2018  9:00 AM        Lab Values    No results found for: SEDRATE  No results found for: CREATININE  No results found for: HGBA1C  No results found for: BUN  Lab Results   Component Value Date    ALBUMIN 3.3 (L) 03/21/2016     No results found for: [id number]          Impression:  1. Venous stasis ulcer of right calf with fat layer exposed without varicose veins (H)  Skin Graft Procedure   2. Venous insufficiency of both lower extremities  Skin Graft Procedure   3. Venous hypertension of both lower extremities  Skin " Graft Procedure   4. CHF (congestive heart failure) (H)  Skin Graft Procedure   5. CKD (chronic kidney disease), symptom management only, stage 4 (severe) (H)  Skin Graft Procedure   6. Morbid obesity with BMI of 50.0-59.9, adult (H)  Skin Graft Procedure       10/26/18 right calf             Are any of these wounds new today: Yes; Location: right calf    Assessment/Plan:          1. Debridement: Excisional debridement of the ulcer(s) was recommended today, after consent was obtained and 2% Xylocaine was applied using a sterile curet the epidermal, dermal and down into the subcutaneous tissue was sharply debrided for a total square cm of 6. The non-viable and necrotic tissue was removed and the wounds appeared much  afterwards.             2. Edema: had a lapse in compression; will apply 2 layer on the right leg; once healed will go back to compression stocking; will write for compression today while the wound is open. The compression wraps were applied today in clinic.           3.  Wound treatment: wound treatment will include irrigation and dressings to promote autolytic debridement which will include:weekly endoform; oil emulsion; abd; rolled gauze; will run for epifix for rapid closure of the wound           4. Nutrition: we spoke about his weight and how this contributes to swelling; reminded to weight self daily for monitoring of chf           5. Offloading: na     Patient will follow up with me in 1 weeks for reevaluation. They were instructed to call the clinic sooner with any signs or symptoms of infection or any further questions/concerns. Answered all questions.    Davida Brooks DNP, RN, CNP, [facility]  [phone]        This note was electronically signed by Davida Brooks

## 2021-06-27 NOTE — PROGRESS NOTES
Progress Notes by Davida Brooks NP at 1/8/2019  9:20 AM     Author: Davida Brooks NP Service: -- Author Type: Nurse Practitioner    Filed: 1/8/2019  9:52 AM Encounter Date: 1/8/2019 Status: Signed    : Davida Brooks NP (Nurse Practitioner)       Follow up Vascular Visit       Date of Service:1/8/2019    Date Last Seen: Visit date not found; Visit date not found    Chief Complaint: right calf wound    History:   Past Medical History:   Diagnosis Date   ? Miguel's disease (H)    ? Adrenal insufficiency (H)    ? Atrial fibrillation (H)    ? Cancer (H)     basal cell carcinoma of the nose   ? Chronic kidney disease    ? Deep vein thrombosis (H)    ? Degenerative joint disease of knee    ? Diabetes mellitus (H)    ? Gout    ? Hepatic steatosis    ? Hypertension    ? Lymphedema    ? Morbid obesity (H)    ? Obesity    ? Pityrosporum folliculitis    ? Pulmonary embolism (H)    ? Systolic heart failure (H)    ? Venous insufficiency        Pt returns to the [facility] with regards to their new right calf wound. Arrives with wife today. Has previously not been seen for 2 years; They report that he was hospitalized this past May for CHF, stage 4 CKD; he is taking bumex prn. He is no longer weighing himself daily. They report that the wound on the right leg started as thickened skin and then he bumped the area and it ulcerated 10 weeks ago. We treated him with 2-3 weeks of gentamcyin and bactroban ointments; his wife did the cares; and ran him for epifix this was declined. We wrapped his right leg in a 2 layer; tolerated well and stayed in place; he did not like the tubigrips. Applying endoform to the wound; changing 1-2 times per week. He had a lapse in wearing his compression stockings and has not been replacing these in a timely fashion. He denies fevers, chills. Is having pain in the wound. He had a pace maker placed recently recovering well from this.  "      Allergies: Morphine; Daptomycin; Piperacillin-tazobactam; Vancomycin; and Zyvox [linezolid]    Physical Exam:    BP (!) 142/106   Pulse 72   Resp 22   Ht 5' 10\" (1.778 m)   Wt (!) 388 lb 3.2 oz (176.1 kg)   BMI 55.70 kg/m      General:  Patient presents to clinic in no apparent distress.  Head: normocephalic atraumatic  Psychiatric:  Alert and oriented x3.   Respiratory: unlabored breathing; no cough  Integumentary:  Skin is uniformly warm, dry and pink.    Wound #1 Location: right calf has  into 2 wounds superior wound measures Size: 4.2x0.8 x 0.1depth.  Inferior wound measures 3.8x1.2x0.1cm  by skin bridge No sinus tract present, Wound base: initially covered with fibrinous material this was debrided reveal 100% viable wound bed;  No undermining present. Periwound: no denudement, erythema, induration, maceration or warmth.  Swelling improved see circumferential measures below.    Circumferential volume measures:    Vasc Edema 10/26/2018 11/2/2018 11/23/2018 12/14/2018 1/8/2019   Right just above MTP 26.6 25.6 27 26.3 26.3   Right Ankle 25.4 25 26.8 26.7 26.2   Right Widest Calf 49 48.4 48.2 48.5 46.8   Right Thigh Up 10cm 74.4 70 74 68.6 64.4   Left - just above MTP 26 26.2 26. 26.5 26.4   Left Ankle 25 24.6 25.1 24.8 24.4   Left Widest Calf 49.8 47.5 49 50.6 48.8   Left Thigh Up 10cm 66.4 65.5 65 65 62.7       Ulceration(s)/Wound(s):     Wound 03/07/16 Leg Left (Active)       Wound 05/04/16 Leg Left;Proximal (near to center) (Active)       VASC Wound 10/26/18 Rt lateral calf (Active)   Pre Size Length 4.2 1/8/2019  9:00 AM   Pre Size Width 0.8 1/8/2019  9:00 AM   Pre Size Depth 0.1 1/8/2019  9:00 AM   Pre Total Sq cm 3.36 1/8/2019  9:00 AM       VASC Wound 11/23/18 ABD (Active)   Pre Size Length 0.5 11/23/2018 10:00 AM   Pre Size Width 0.5 11/23/2018 10:00 AM   Pre Size Depth 0.1 11/23/2018 10:00 AM   Pre Total Sq cm 0.25 11/23/2018 10:00 AM       VAS Wound 01/08/19 Rt lateral " (Posterior) (Active)   Pre Size Length 3.8 1/8/2019  9:00 AM   Pre Size Width 1.2 1/8/2019  9:00 AM   Pre Size Depth 0.1 1/8/2019  9:00 AM   Pre Total Sq cm 4.56 1/8/2019  9:00 AM        Lab Values    No results found for: SEDRATE  No results found for: CREATININE  No results found for: HGBA1C  No results found for: BUN  Lab Results   Component Value Date    ALBUMIN 3.3 (L) 03/21/2016     No results found for: [id number]          Impression:  1. Venous hypertension of lower extremity, bilateral     2. Venous insufficiency of both lower extremities     3. Venous stasis dermatitis of both lower extremities     4. Venous stasis ulcer of right calf with fat layer exposed without varicose veins (H)     5. Venous hypertension of both lower extremities     6. Congestive heart failure, unspecified HF chronicity, unspecified heart failure type (H)     7. CKD (chronic kidney disease), symptom management only, stage 4 (severe) (H)     8. Morbid obesity with BMI of 50.0-59.9, adult (H)       11/2/18 right calf          10/26/18 right calf             Are any of these wounds new today: no    Assessment/Plan:          1. Debridement: Excisional debridement of the right leg ulcer(s) was recommended today, after consent was obtained and 2% Xylocaine was applied using a sterile curet the epidermal, dermal and down into the subcutaneous tissue was sharply debrided for a total square cm of 7.92   The non-viable and necrotic tissue was removed and the wounds appeared much  afterwards.             2. Edema: continue 2 layer to the right leg; compression stocking to the right leg; ok for wife to change this           3.  Wound treatment: wound treatment will include irrigation and dressings to promote autolytic debridement which will include endoform collagen; cover with sorbact, gauze; rolled gauze; change daily           4. Nutrition: we spoke about his weight and how this contributes to swelling; reminded to weight self daily  for monitoring of chf; taking MVI and vitamin d supplement; focusing on protein in the diet           5. Offloading: na     Patient will follow up with me in 3 weeks for reevaluation. They were instructed to call the clinic sooner with any signs or symptoms of infection or any further questions/concerns. Answered all questions.    Davida Brooks DNP, RN, CNP, [facility]  [phone]        This note was electronically signed by Davida Brooks

## 2021-06-27 NOTE — PROGRESS NOTES
Progress Notes by Davida Brooks NP at 12/14/2018  9:40 AM     Author: Davida Brooks NP Service: -- Author Type: Nurse Practitioner    Filed: 12/14/2018 12:04 PM Encounter Date: 12/14/2018 Status: Signed    : Davida Brooks NP (Nurse Practitioner)       Follow up Vascular Visit       Date of Service:12/14/2018    Date Last Seen: Visit date not found; Visit date not found    Chief Complaint: right calf wound    History:   Past Medical History:   Diagnosis Date   ? Miguel's disease (H)    ? Adrenal insufficiency (H)    ? Atrial fibrillation (H)    ? Cancer (H)     basal cell carcinoma of the nose   ? Chronic kidney disease    ? Deep vein thrombosis (H)    ? Degenerative joint disease of knee    ? Diabetes mellitus (H)    ? Gout    ? Hepatic steatosis    ? Hypertension    ? Lymphedema    ? Morbid obesity (H)    ? Obesity    ? Pityrosporum folliculitis    ? Pulmonary embolism (H)    ? Systolic heart failure (H)    ? Venous insufficiency        Pt returns to the [facility] with regards to their new right calf wound. Arrives with wife today. Has previously not been seen for 2 years; They report that he was hospitalized this past May for CHF, stage 4 CKD; he is taking bumex prn. He is no longer weighing himself daily. They report that the wound on the right leg started as thickened skin and then he bumped the area and it ulcerated 10 weeks ago. We treated him with 2-3 weeks of gentamcyin and bactroban ointments; his wife did the cares; and ran him for epifix this was declined. We wrapped his right leg in a 2 layer; tolerated well and stayed in place; he did not like the tubigrips. Applying endoform to the wound; changing 1-2 times per week. He had a lapse in wearing his compression stockings and has not been replacing these in a timely fashion. He denies fevers, chills. Is having pain in the wound. He had a pace maker placed recently recovering well from  this.       Allergies: Morphine; Daptomycin; Piperacillin-tazobactam; Vancomycin; and Zyvox [linezolid]    Physical Exam:    /84   Pulse 60   Temp 98.6  F (37  C)   Resp 18     General:  Patient presents to clinic in no apparent distress.  Head: normocephalic atraumatic  Psychiatric:  Alert and oriented x3.   Respiratory: unlabored breathing; no cough  Integumentary:  Skin is uniformly warm, dry and pink.    Wound #1 Location: right calf  Size: 4.8 x 2.5W x 0.1depth.  nosinus tract present, Wound base: initially covered with fibrinous material this was debrided reveal 100% viable wound bed;  No undermining present. Periwound: no denudement, erythema, induration, maceration or warmth.  Swelling improved see circumferential measures below.    Circumferential volume measures:    Vasc Edema 6/8/2016 10/26/2018 11/2/2018 11/23/2018 12/14/2018   Right just above MTP 25.5 26.6 25.6 27 26.3   Right Ankle 24.4 25.4 25 26.8 26.7   Right Widest Calf 53 49 48.4 48.2 48.5   Right Thigh Up 10cm - 74.4 70 74 68.6   Left - just above MTP 25.5 26 26.2 26. 26.5   Left Ankle 24.3 25 24.6 25.1 24.8   Left Widest Calf 48 49.8 47.5 49 50.6   Left Thigh Up 10cm - 66.4 65.5 65 65       Ulceration(s)/Wound(s):     Wound 03/07/16 Leg Left (Active)       Wound 05/04/16 Leg Left;Proximal (near to center) (Active)       VASC Wound 10/26/18 Rt lateral calf (Active)   Pre Size Length 4.8 12/14/2018 11:00 AM   Pre Size Width 2.5 12/14/2018 11:00 AM   Pre Size Depth 0.1 12/14/2018 11:00 AM   Pre Total Sq cm 12 12/14/2018 11:00 AM       VASC Wound 11/23/18 ABD (Active)   Pre Size Length 0.5 11/23/2018 10:00 AM   Pre Size Width 0.5 11/23/2018 10:00 AM   Pre Size Depth 0.1 11/23/2018 10:00 AM   Pre Total Sq cm 0.25 11/23/2018 10:00 AM        Lab Values    No results found for: SEDRATE  No results found for: CREATININE  No results found for: HGBA1C  No results found for: BUN  Lab Results   Component Value Date    ALBUMIN 3.3 (L) 03/21/2016      No results found for: [id number]          Impression:  No diagnosis found.  11/2/18 right calf          10/26/18 right calf             Are any of these wounds new today: no    Assessment/Plan:          1. Debridement: Excisional debridement of the right leg ulcer(s) was recommended today, after consent was obtained and 2% Xylocaine was applied using a sterile curet the epidermal, dermal and down into the subcutaneous tissue was sharply debrided for a total square cm of  12 The non-viable and necrotic tissue was removed and the wounds appeared much  afterwards.             2. Edema: continue 2 layer to the right leg; compression stocking to the right leg           3.  Wound treatment: wound treatment will include irrigation and dressings to promote autolytic debridement which will include endoform collagen; cover with sorbact, gauze; rolled gauze; change daily           4. Nutrition: we spoke about his weight and how this contributes to swelling; reminded to weight self daily for monitoring of chf           5. Offloading: na     Patient will follow up with me in 3 weeks for reevaluation. They were instructed to call the clinic sooner with any signs or symptoms of infection or any further questions/concerns. Answered all questions.    Davida Brooks DNP, RN, CNP, [facility]  [phone]        This note was electronically signed by Davida Brooks